# Patient Record
Sex: MALE | Race: WHITE | Employment: FULL TIME | RURAL
[De-identification: names, ages, dates, MRNs, and addresses within clinical notes are randomized per-mention and may not be internally consistent; named-entity substitution may affect disease eponyms.]

---

## 2020-09-27 ENCOUNTER — HOSPITAL ENCOUNTER (EMERGENCY)
Facility: HOSPITAL | Age: 25
Discharge: HOME | End: 2020-09-27
Attending: EMERGENCY MEDICINE
Payer: MEDICAID

## 2020-09-27 ENCOUNTER — APPOINTMENT (OUTPATIENT)
Dept: GENERAL RADIOLOGY | Facility: HOSPITAL | Age: 25
End: 2020-09-27
Payer: MEDICAID

## 2020-09-27 VITALS
HEIGHT: 67 IN | HEART RATE: 64 BPM | DIASTOLIC BLOOD PRESSURE: 66 MMHG | RESPIRATION RATE: 18 BRPM | BODY MASS INDEX: 29.82 KG/M2 | SYSTOLIC BLOOD PRESSURE: 123 MMHG | WEIGHT: 190 LBS | TEMPERATURE: 98.4 F | OXYGEN SATURATION: 96 %

## 2020-09-27 PROCEDURE — 73130 X-RAY EXAM OF HAND: CPT

## 2020-09-27 PROCEDURE — 99281 EMR DPT VST MAYX REQ PHY/QHP: CPT

## 2020-09-27 PROCEDURE — 99282 EMERGENCY DEPT VISIT SF MDM: CPT

## 2020-09-27 ASSESSMENT — PAIN DESCRIPTION - DESCRIPTORS: DESCRIPTORS: DISCOMFORT

## 2020-09-27 ASSESSMENT — PAIN DESCRIPTION - PAIN TYPE: TYPE: ACUTE PAIN

## 2020-09-27 ASSESSMENT — PAIN DESCRIPTION - ONSET: ONSET: SUDDEN

## 2020-09-27 ASSESSMENT — PAIN DESCRIPTION - FREQUENCY: FREQUENCY: CONTINUOUS

## 2020-09-27 ASSESSMENT — PAIN DESCRIPTION - ORIENTATION: ORIENTATION: RIGHT

## 2020-09-27 ASSESSMENT — PAIN DESCRIPTION - LOCATION: LOCATION: HAND

## 2020-09-27 ASSESSMENT — PAIN SCALES - WONG BAKER: WONGBAKER_NUMERICALRESPONSE: 4

## 2020-09-27 ASSESSMENT — PAIN SCALES - GENERAL: PAINLEVEL_OUTOF10: 3

## 2020-09-28 NOTE — ED PROVIDER NOTES
education level: None   Occupational History    None   Social Needs    Financial resource strain: None    Food insecurity     Worry: None     Inability: None    Transportation needs     Medical: None     Non-medical: None   Tobacco Use    Smoking status: Current Every Day Smoker    Smokeless tobacco: Current User   Substance and Sexual Activity    Alcohol use: None    Drug use: Never    Sexual activity: None   Lifestyle    Physical activity     Days per week: None     Minutes per session: None    Stress: None   Relationships    Social connections     Talks on phone: None     Gets together: None     Attends [employer] service: None     Active member of club or organization: None     Attends meetings of clubs or organizations: None     Relationship status: None    Intimate partner violence     Fear of current or ex partner: None     Emotionally abused: None     Physically abused: None     Forced sexual activity: None   Other Topics Concern    None   Social History Narrative    None         PHYSICAL EXAM    (up to 7 forlevel 4, 8 or more for level 5)     ED Triage Vitals [09/27/20 2155]   BP Temp Temp Source Pulse Resp SpO2 Height Weight   125/69 98.4 °F (36.9 °C) Oral 69 18 98 % 5' 7\" (1.702 m) 190 lb (86.2 kg)       Physical Exam  General :Patient is awake, alert, oriented, in no acute distress, nontoxic appearing  HEENT: Pupils are equally round and reactive to light, EOMI. Cardiac: Heart regular rate, rhythm, no murmurs, rubs, or gallops  Lungs: Lungs are clear to auscultation, there is no wheezing, rhonchi, or rales. Abdomen:Abdomen is soft, nontender, nondistended. Musculoskeletal: Ambulatory; right middle MCP joint is swollen with overlying ecchymosis on dorsal side; patient is unable to fully extend his finger at the MCP joint which is suspicious for extensor tendon injury; neurovascularly intact. Back: No midline or bony tenderness.    Dermatology: Skin is warm and dry  Psych: Mentation is grossly normal, cognition is grossly normal. Affect is appropriate. DIAGNOSTIC RESULTS       RADIOLOGY:   Non-plain film images such as CT, Ultrasoundand MRI are read by the radiologist. Plain radiographic images are visualized and preliminarily interpreted by the emergency physician with the below findings:      [x] Radiologist's Report Reviewed:  XR HAND RIGHT (MIN 3 VIEWS)   Final Result      Soft tissue swelling. No evidence of fracture. ED BEDSIDE ULTRASOUND:   Performed by ED Physician - none    LABS:  Labs Reviewed - No data to display    I have reviewed and interpreted all of the currently available lab resultsfrom this visit (if applicable):  No results found for this visit on 09/27/20. All other labs were within normal range or not returned as of this dictation. EMERGENCY DEPARTMENT COURSE and DIFFERENTIAL DIAGNOSIS/MDM:   Vitals:    Vitals:    09/27/20 2155   BP: 125/69   Pulse: 69   Resp: 18   Temp: 98.4 °F (36.9 °C)   TempSrc: Oral   SpO2: 98%   Weight: 190 lb (86.2 kg)   Height: 5' 7\" (1.702 m)     Finger splinted in a hyperextended position due to probable extensor tendon injury. The patient will follow-up with their PCP in 1-2 days for reevaluation. If the patient or family members have any further concerns or any worsening symptoms they will return to the ED for reevaluation. CONSULTS:  None    PROCEDURES:  Procedures    CRITICAL CARE TIME    Total Critical Care time was 0 minutes, excluding separately reportable procedures. There was a high probability of clinically significant/life threatening deterioration in the patient's condition which required my urgent intervention. FINAL IMPRESSION      1.  Extensor tendon disruption          DISPOSITION/PLAN   DISPOSITION Discharge - Pending Orders Complete 09/27/2020 10:07:53 PM      PATIENT REFERRED TO:  Jeimy Blair MD  [address] [address], [address] [facility] [address]  [phone]            DISCHARGE MEDICATIONS:  New Prescriptions    No medications on file       Comment: Please note this report has been produced using speech recognition software and may contain errors related tothat system including errors in grammar, punctuation, and spelling, as well as words and phrases that may be inappropriate. If there are any questions or concerns please feel free to contact the dictating provider forclarification.     Nelson Smith MD  Attending Emergency Physician                  Nelson Smith MD  09/27/20 [id number]

## 2020-09-28 NOTE — ED TRIAGE NOTES
Pt states was working on house when board fell onto rt hand, occurred 2 days ago, rt hand swollen and c/o dull pain, decreased rom

## 2024-09-06 ENCOUNTER — TELEPHONE (OUTPATIENT)
Dept: FAMILY MEDICINE CLINIC | Facility: CLINIC | Age: 29
End: 2024-09-06